# Patient Record
Sex: MALE | Race: WHITE | NOT HISPANIC OR LATINO
[De-identification: names, ages, dates, MRNs, and addresses within clinical notes are randomized per-mention and may not be internally consistent; named-entity substitution may affect disease eponyms.]

---

## 2022-11-07 ENCOUNTER — APPOINTMENT (OUTPATIENT)
Dept: DERMATOLOGY | Facility: CLINIC | Age: 82
End: 2022-11-07

## 2022-11-09 ENCOUNTER — APPOINTMENT (OUTPATIENT)
Dept: SURGERY | Facility: CLINIC | Age: 82
End: 2022-11-09

## 2024-04-02 ENCOUNTER — APPOINTMENT (OUTPATIENT)
Dept: ORTHOPEDIC SURGERY | Facility: CLINIC | Age: 84
End: 2024-04-02
Payer: MEDICARE

## 2024-04-02 VITALS — BODY MASS INDEX: 24.01 KG/M2 | HEIGHT: 67 IN | WEIGHT: 153 LBS

## 2024-04-02 DIAGNOSIS — Z78.9 OTHER SPECIFIED HEALTH STATUS: ICD-10-CM

## 2024-04-02 DIAGNOSIS — M77.12 LATERAL EPICONDYLITIS, LEFT ELBOW: ICD-10-CM

## 2024-04-02 PROCEDURE — 73080 X-RAY EXAM OF ELBOW: CPT | Mod: LT

## 2024-04-02 PROCEDURE — 99203 OFFICE O/P NEW LOW 30 MIN: CPT

## 2024-04-02 RX ORDER — SERTRALINE 25 MG/1
TABLET, FILM COATED ORAL
Refills: 0 | Status: ACTIVE | COMMUNITY

## 2024-04-02 RX ORDER — DOXAZOSIN 8 MG/1
TABLET ORAL
Refills: 0 | Status: ACTIVE | COMMUNITY

## 2024-04-02 RX ORDER — ATORVASTATIN CALCIUM 80 MG/1
TABLET, FILM COATED ORAL
Refills: 0 | Status: ACTIVE | COMMUNITY

## 2024-04-02 RX ORDER — LEVOTHYROXINE SODIUM 200 UG/1
CAPSULE ORAL
Refills: 0 | Status: ACTIVE | COMMUNITY

## 2024-04-02 NOTE — IMAGING
[Left] : left elbow [FreeTextEntry1] : Reviewed and interpreted.  Left elbow AP, lateral and oblique views-negative  [de-identified] : Left elbow Inspection normal Full active range of motion Mild to moderate tenderness lateral epicondyle Mild pain with wrist dorsiflexion against resistance Collaterals are stable Elbow flexion/extension 5/5 Radial pulse 2+

## 2024-04-02 NOTE — HISTORY OF PRESENT ILLNESS
[Left Arm] : left arm [Gradual] : gradual [5] : 5 [2] : 2 [Dull/Aching] : dull/aching [Rest] : rest [Exercising] : exercising [Retired] : Work status: retired [de-identified] : Patient is an 83-year-old right-hand-dominant male with pain left lateral elbow over the past 6 weeks.  No injury.  He does workout in the gym on a regular basis.  He has mild to moderate pain when lifting and grasping.  Occasional pain radiating to his left forearm.  No swelling.  Seen today with his wife, Kimi [] : no [FreeTextEntry7] : L Forearm

## 2024-04-02 NOTE — DISCUSSION/SUMMARY
[de-identified] : Ice prn He is to modify his exercises in the gym Voltaren gel prn Referred for physical therapy program I discussed possible cortisone injection if ongoing symptoms  Impression: Lateral epicondylitis left elbow